# Patient Record
Sex: FEMALE | Race: WHITE | Employment: UNEMPLOYED | URBAN - NONMETROPOLITAN AREA
[De-identification: names, ages, dates, MRNs, and addresses within clinical notes are randomized per-mention and may not be internally consistent; named-entity substitution may affect disease eponyms.]

---

## 2024-05-10 ENCOUNTER — OFFICE VISIT (OUTPATIENT)
Dept: PRIMARY CARE CLINIC | Age: 19
End: 2024-05-10
Payer: COMMERCIAL

## 2024-05-10 VITALS
SYSTOLIC BLOOD PRESSURE: 116 MMHG | DIASTOLIC BLOOD PRESSURE: 66 MMHG | OXYGEN SATURATION: 99 % | RESPIRATION RATE: 16 BRPM | WEIGHT: 216 LBS | HEART RATE: 69 BPM | TEMPERATURE: 98.1 F

## 2024-05-10 DIAGNOSIS — B35.9 RINGWORM: Primary | ICD-10-CM

## 2024-05-10 DIAGNOSIS — D22.9 ATYPICAL MOLE: ICD-10-CM

## 2024-05-10 PROCEDURE — 99203 OFFICE O/P NEW LOW 30 MIN: CPT

## 2024-05-10 RX ORDER — KETOCONAZOLE 20 MG/G
CREAM TOPICAL
Qty: 30 G | Refills: 1 | Status: SHIPPED | OUTPATIENT
Start: 2024-05-10

## 2024-05-10 ASSESSMENT — ENCOUNTER SYMPTOMS
CHEST TIGHTNESS: 0
DIARRHEA: 0
EYE REDNESS: 0
COUGH: 0
BACK PAIN: 0
ALLERGIC/IMMUNOLOGIC NEGATIVE: 1
SHORTNESS OF BREATH: 0
VOMITING: 0
SORE THROAT: 0
CONSTIPATION: 0
SINUS PAIN: 0
RHINORRHEA: 0
WHEEZING: 0
EYE ITCHING: 0
NAUSEA: 0
ABDOMINAL PAIN: 0
EYE DISCHARGE: 0

## 2024-05-10 NOTE — PATIENT INSTRUCTIONS
Ringworm:  Ketoconazole cream prescribed; take as directed.   Avoid touching/scratching the area, as it can be spread from person to person as well as to pets.   Wash hands frequently.  Wash all clothes/linens in hot water.  Follow up with PCP or return to clinic if symptoms worsening or not improving.     Atypical Mole:   Dermatology referral initiated.   Call local offices and to schedule appointment with office that accepts your insurance  Do not pick at mole    Any condition can change, despite proper treatment. Therefore, if symptoms still persist or worsen after treatment plan intitated today, patient is to go to the nearest ER, call PCP, or return to urgent care for further evaluation.     Urgent Care evaluation today is not a substitute for PCP visit. Follow up care is the patient's responsibility to discuss and review this UC visit.

## 2024-05-10 NOTE — PROGRESS NOTES
General: No tenderness or signs of injury. Normal range of motion.      Cervical back: Normal range of motion.   Skin:     General: Skin is warm and dry.      Capillary Refill: Capillary refill takes less than 2 seconds.      Findings: Erythema, lesion and rash present. No bruising. Rash is scaling.          Neurological:      Mental Status: She is alert and oriented to person, place, and time.      Motor: No weakness.      Coordination: Coordination normal.   Psychiatric:         Mood and Affect: Mood normal.         Behavior: Behavior normal.       /66   Pulse 69   Temp 98.1 °F (36.7 °C) (Temporal)   Resp 16   Wt 98 kg (216 lb)   SpO2 99%     Assessment   Assessment & Plan      Diagnosis Orders   1. Ringworm  ketoconazole (NIZORAL) 2 % cream      2. Atypical mole  External Referral To Dermatology          Plan     Ringworm:  Ketoconazole cream prescribed; take as directed.   Avoid touching/scratching the area, as it can be spread from person to person as well as to pets.   Wash hands frequently.  Wash all clothes/linens in hot water.  Follow up with PCP or return to clinic if symptoms worsening or not improving.     Atypical Mole:   Dermatology referral initiated.   Hard copy of referral provided to patient. Advised patient to call local offices and to schedule appointment with office that accepts her insurance  Do not pick at mole    Any condition can change, despite proper treatment. Therefore, if symptoms still persist or worsen after treatment plan intitated today, patient is to go to the nearest ER, call PCP, or return to urgent care for further evaluation.     Urgent Care evaluation today is not a substitute for PCP visit. Follow up care is the patient's responsibility to discuss and review this UC visit.     Orders Placed This Encounter   Procedures    External Referral To Dermatology     Referral Priority:   Routine     Referral Type:   Eval and Treat     Referral Reason:   Specialty Services